# Patient Record
Sex: MALE | Race: WHITE | NOT HISPANIC OR LATINO | Employment: OTHER | URBAN - METROPOLITAN AREA
[De-identification: names, ages, dates, MRNs, and addresses within clinical notes are randomized per-mention and may not be internally consistent; named-entity substitution may affect disease eponyms.]

---

## 2022-05-26 ENCOUNTER — OFFICE VISIT (OUTPATIENT)
Dept: PODIATRY | Facility: CLINIC | Age: 77
End: 2022-05-26
Payer: MEDICARE

## 2022-05-26 VITALS
HEIGHT: 74 IN | TEMPERATURE: 98 F | HEART RATE: 69 BPM | DIASTOLIC BLOOD PRESSURE: 76 MMHG | BODY MASS INDEX: 30.8 KG/M2 | SYSTOLIC BLOOD PRESSURE: 145 MMHG | WEIGHT: 240 LBS

## 2022-05-26 DIAGNOSIS — L97.511 ULCER OF BOTH FEET, LIMITED TO BREAKDOWN OF SKIN: ICD-10-CM

## 2022-05-26 DIAGNOSIS — L97.521 ULCER OF BOTH FEET, LIMITED TO BREAKDOWN OF SKIN: ICD-10-CM

## 2022-05-26 DIAGNOSIS — E11.49 TYPE II DIABETES MELLITUS WITH NEUROLOGICAL MANIFESTATIONS: Primary | ICD-10-CM

## 2022-05-26 DIAGNOSIS — E11.9 COMPREHENSIVE DIABETIC FOOT EXAMINATION, TYPE 2 DM, ENCOUNTER FOR: ICD-10-CM

## 2022-05-26 PROCEDURE — 99204 OFFICE O/P NEW MOD 45 MIN: CPT | Mod: PBBFAC,PN | Performed by: PODIATRIST

## 2022-05-26 PROCEDURE — 99999 PR PBB SHADOW E&M-NEW PATIENT-LVL IV: ICD-10-PCS | Mod: PBBFAC,,, | Performed by: PODIATRIST

## 2022-05-26 PROCEDURE — 99999 PR PBB SHADOW E&M-NEW PATIENT-LVL IV: CPT | Mod: PBBFAC,,, | Performed by: PODIATRIST

## 2022-05-26 PROCEDURE — 99203 PR OFFICE/OUTPT VISIT, NEW, LEVL III, 30-44 MIN: ICD-10-PCS | Mod: S$PBB,,, | Performed by: PODIATRIST

## 2022-05-26 PROCEDURE — 99203 OFFICE O/P NEW LOW 30 MIN: CPT | Mod: S$PBB,,, | Performed by: PODIATRIST

## 2022-05-26 RX ORDER — AMLODIPINE BESYLATE 10 MG/1
TABLET ORAL
COMMUNITY
Start: 2021-07-06

## 2022-05-26 RX ORDER — LEVOTHYROXINE SODIUM 100 UG/1
100 TABLET ORAL EVERY MORNING
COMMUNITY
Start: 2022-05-06

## 2022-05-26 RX ORDER — ASPIRIN 81 MG/1
81 TABLET ORAL
COMMUNITY

## 2022-05-26 RX ORDER — CLOPIDOGREL BISULFATE 75 MG/1
TABLET ORAL
COMMUNITY
Start: 2021-06-10

## 2022-05-26 RX ORDER — EXENATIDE 2 MG/.85ML
INJECTION, SUSPENSION, EXTENDED RELEASE SUBCUTANEOUS
COMMUNITY
Start: 2021-06-10 | End: 2022-06-10

## 2022-05-26 RX ORDER — CELECOXIB 200 MG/1
CAPSULE ORAL
COMMUNITY
Start: 2022-03-07

## 2022-05-26 RX ORDER — HYDROCHLOROTHIAZIDE 12.5 MG/1
TABLET ORAL
COMMUNITY
Start: 2021-08-25

## 2022-05-26 RX ORDER — METFORMIN HYDROCHLORIDE 1000 MG/1
TABLET ORAL
COMMUNITY
Start: 2021-07-06

## 2022-05-26 RX ORDER — METOPROLOL SUCCINATE 50 MG/1
50 TABLET, EXTENDED RELEASE ORAL DAILY
COMMUNITY
Start: 2022-05-06

## 2022-05-26 RX ORDER — RAMIPRIL 10 MG/1
20 CAPSULE ORAL DAILY
COMMUNITY
Start: 2022-03-21

## 2022-05-26 RX ORDER — LANOLIN ALCOHOL/MO/W.PET/CERES
400 CREAM (GRAM) TOPICAL
COMMUNITY

## 2022-05-26 RX ORDER — EMPAGLIFLOZIN 25 MG/1
1 TABLET, FILM COATED ORAL EVERY MORNING
COMMUNITY
Start: 2021-09-08 | End: 2022-09-08

## 2022-05-26 RX ORDER — CHOLECALCIFEROL (VITAMIN D3) 25 MCG
1000 TABLET ORAL
COMMUNITY

## 2022-05-26 RX ORDER — ATORVASTATIN CALCIUM 20 MG/1
TABLET, FILM COATED ORAL
COMMUNITY
Start: 2021-07-06

## 2022-05-30 NOTE — PROGRESS NOTES
"Ice 20 minutes per hour, (20 minutes on, 40 minutes off) at least 4 times a day.   You can alternate with heat if desired.  Physical therapy/massage/back exercises and core strengthening can also be helpful.  Tylenol/ibuprofen as needed for pain.  You may use the Flexeril as needed for spasm.  This can cause drowsiness or you may want to use it just at bedtime.  Some people find it helpful, others not so much.  Recheck in clinic with Dr. Wang if persistent problems.  Please return to the ED if you lose control of your bladder or bowels, fever over 100.4, weakness in the legs, or any concerns.  Gentle activity as tolerated today.  Avoid heavy lifting, bending or twisting until things settle down.  Please say \"hi\" to Liz for me!  It was nice visiting with both of you this morning.  I am glad you are feeling better and hope you continue to improve.    Thank you for choosing [facility]. We appreciate the opportunity to meet your urgent medical needs. Please let us know if we could have done anything to make your stay more satisfying.    After discharge, please closely monitor for any new or worsening symptoms. Return to the Emergency Department if you develop any acute worsening signs or symptoms.    If you had lab work, cultures or imaging studies done during your stay, the final results may still be pending. We will call you if your plan of care needs to change. However, if you are not improving as expected, please follow up with your primary care provider or clinic.     Start any prescription medications that were prescribed to you and take them as directed.     Please see additional handouts that may be pertinent to your condition.        " Subjective:       Patient ID: Moses Dela Cruz is a 77 y.o. male.    Chief Complaint: Callouses, Foot Problem, and Diabetes Mellitus    Patient presents today for a new patient diabetic evaluation.  Patient states he has been diabetic for many years he is mostly controlled he states his last A1c was 7.1.  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  Past Medical History:   Diagnosis Date    Diabetes mellitus, type 2     Hypertension      Past Surgical History:   Procedure Laterality Date    ACHILLES TENDON SURGERY      right     APPENDECTOMY      CAROTID STENT      CATARACT EXTRACTION      CHOLECYSTECTOMY      HERNIA REPAIR      TONSILLECTOMY       Family History   Problem Relation Age of Onset    Diabetes Sister      Social History     Socioeconomic History    Marital status:    Tobacco Use    Smoking status: Never Smoker    Smokeless tobacco: Never Used   Substance and Sexual Activity    Alcohol use: Yes    Drug use: Never    Sexual activity: Not Currently       Current Outpatient Medications   Medication Sig Dispense Refill    amLODIPine (NORVASC) 10 MG tablet       atorvastatin (LIPITOR) 20 MG tablet       celecoxib (CELEBREX) 200 MG capsule       clopidogreL (PLAVIX) 75 mg tablet       empagliflozin (JARDIANCE) 25 mg tablet Take 1 tablet by mouth every morning.      exenatide microspheres (BYDUREON BCISE) 2 mg/0.85 mL AtIn Inject into the skin.      hydroCHLOROthiazide (HYDRODIURIL) 12.5 MG Tab       metFORMIN (GLUCOPHAGE) 1000 MG tablet       aspirin (ECOTRIN) 81 MG EC tablet Take 81 mg by mouth.      levothyroxine (SYNTHROID) 100 MCG tablet Take 100 mcg by mouth every morning.      magnesium oxide (MAG-OX) 400 mg (241.3 mg magnesium) tablet Take 400 mg by mouth.      metoprolol succinate (TOPROL-XL) 50 MG 24 hr tablet Take 50 mg by mouth once daily.   "    ramipriL (ALTACE) 10 MG capsule Take 20 mg by mouth once daily.      vitamin D (VITAMIN D3) 1000 units Tab Take 1,000 Units by mouth.       No current facility-administered medications for this visit.     Review of patient's allergies indicates:   Allergen Reactions    Naproxen Swelling     Other reaction(s): Welts  welts, rash, hives. can tolerate asa 81mg, celebrex prn  welts, rash, hives. can tolerate asa 81mg, celebrex prn      Amoxicillin Other (See Comments)     Other reaction(s): Esophagitis    Testosterone      Other reaction(s): Rash    Adhesive Hives    Ibuprofen      Other reaction(s): Other (See Comments), Urticaria  unknown         Review of Systems   Musculoskeletal: Positive for arthralgias.   Neurological: Positive for numbness.   All other systems reviewed and are negative.      Objective:      Vitals:    05/26/22 0809   BP: (!) 145/76   Pulse: 69   Temp: 97.8 °F (36.6 °C)   Weight: 108.9 kg (240 lb)   Height: 6' 2" (1.88 m)     Physical Exam  Vitals and nursing note reviewed.   Constitutional:       Appearance: Normal appearance.   Cardiovascular:      Pulses:           Dorsalis pedis pulses are 1+ on the right side and 1+ on the left side.        Posterior tibial pulses are 1+ on the right side and 1+ on the left side.   Pulmonary:      Effort: Pulmonary effort is normal.   Musculoskeletal:         General: Tenderness and deformity present.      Right foot: Deformity present.      Left foot: Deformity present.        Feet:    Feet:      Right foot:      Protective Sensation: 3 sites tested. 1 site sensed.     Skin integrity: Ulcer, skin breakdown and callus present.      Left foot:      Protective Sensation: 3 sites tested. 1 site sensed.     Skin integrity: Callus present.   Skin:     Capillary Refill: Capillary refill takes 2 to 3 seconds.      Findings: Erythema present.   Neurological:      Mental Status: He is alert.      Sensory: Sensory deficit present.   Psychiatric:         " Mood and Affect: Mood normal.         Behavior: Behavior normal.         Thought Content: Thought content normal.         Judgment: Judgment normal.                          Assessment:       1. Type II diabetes mellitus with neurological manifestations    2. Comprehensive diabetic foot examination, type 2 DM, encounter for    3. Ulcer of both feet, limited to breakdown of skin        Plan:       Patient presents today for a new patient diabetic evaluation.  Patient states he has been diabetic for many years he is mostly controlled he states his last A1c was 7.1.  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  A comprehensive new patient diabetic evaluation was performed today patient has no areas of active infection no drainage however he does have multiple areas of pre ulcerative hyperkeratotic lesions on the plantar aspect of the patient's forefoot bilateral most specifically the plantar forefoot sub 4th and 5th metatarsal area right and the patient also has an area of breakdown and ulceration on the medial aspect of the 5th digit right.  Patient provided diabetic education all of the hyperkeratotic lesions were debrided non excisionally I have advised the patient he needs to start to apply 40% urea cream to the areas on the balls of his feet not to the area between the 4th and 5th digit on the right I dispensed the patient a Silipos toe spacer to use at all times after I debrided the area.  Patient was dispensed K1 [employer]'s large diabetic insoles I have added additional blue arch padding and will consider metatarsal pads as necessary advising the patient we need to transfer the weight from the ball of his foot into the arch area.  Patient will cover the area with 40% urea as well as a light bandage daily taking this off at night I will consider the metatarsal pads as necessary patient does have  an area of previous Achilles tendon repair right where he appears to have 2 prominent screws at the posterior aspect of the calcaneus right this is something that definitely needs to be monitored and is at risk for breakdown.  Plan follow-up will be over the next several weeks depending upon how the patient is doing with these areas of pre ulceration certainly if they become worse or painful again the patient will contact us immediately he states they were completely nontender following debridement today.  Extended visit time was provided today due to review of medical chart and the patient has multiple complaints as well as comprehensive diabetic evaluation and multiple areas of pre ulcerative hyperkeratotic lesions this includes fitting the patient for appropriate arch support.This note was created using [employer] voice recognition software that occasionally misinterpreted phrases or words.

## 2022-09-08 ENCOUNTER — OFFICE VISIT (OUTPATIENT)
Dept: PODIATRY | Facility: CLINIC | Age: 77
End: 2022-09-08
Payer: MEDICARE

## 2022-09-08 VITALS
HEART RATE: 72 BPM | WEIGHT: 240 LBS | SYSTOLIC BLOOD PRESSURE: 134 MMHG | DIASTOLIC BLOOD PRESSURE: 68 MMHG | BODY MASS INDEX: 30.8 KG/M2 | TEMPERATURE: 98 F | HEIGHT: 74 IN

## 2022-09-08 DIAGNOSIS — E11.9 COMPREHENSIVE DIABETIC FOOT EXAMINATION, TYPE 2 DM, ENCOUNTER FOR: ICD-10-CM

## 2022-09-08 DIAGNOSIS — E11.9 TYPE 2 DIABETES MELLITUS WITHOUT COMPLICATION, WITHOUT LONG-TERM CURRENT USE OF INSULIN: Primary | ICD-10-CM

## 2022-09-08 DIAGNOSIS — L97.511 ULCER OF RIGHT FOOT, LIMITED TO BREAKDOWN OF SKIN: ICD-10-CM

## 2022-09-08 PROCEDURE — 99214 OFFICE O/P EST MOD 30 MIN: CPT | Mod: PBBFAC,PN | Performed by: PODIATRIST

## 2022-09-08 PROCEDURE — 99213 OFFICE O/P EST LOW 20 MIN: CPT | Mod: S$PBB,,, | Performed by: PODIATRIST

## 2022-09-08 PROCEDURE — 99999 PR PBB SHADOW E&M-EST. PATIENT-LVL IV: ICD-10-PCS | Mod: PBBFAC,,, | Performed by: PODIATRIST

## 2022-09-08 PROCEDURE — 99999 PR PBB SHADOW E&M-EST. PATIENT-LVL IV: CPT | Mod: PBBFAC,,, | Performed by: PODIATRIST

## 2022-09-08 PROCEDURE — 99213 PR OFFICE/OUTPT VISIT, EST, LEVL III, 20-29 MIN: ICD-10-PCS | Mod: S$PBB,,, | Performed by: PODIATRIST

## 2022-09-08 RX ORDER — EXENATIDE 2 MG/.85ML
INJECTION, SUSPENSION, EXTENDED RELEASE SUBCUTANEOUS
COMMUNITY
Start: 2022-08-19

## 2022-09-10 PROBLEM — E11.9 COMPREHENSIVE DIABETIC FOOT EXAMINATION, TYPE 2 DM, ENCOUNTER FOR: Status: ACTIVE | Noted: 2022-09-10

## 2022-09-10 PROBLEM — L97.511 ULCER OF RIGHT FOOT, LIMITED TO BREAKDOWN OF SKIN: Status: ACTIVE | Noted: 2022-09-10

## 2022-09-10 PROBLEM — E11.9 TYPE 2 DIABETES MELLITUS WITHOUT COMPLICATION, WITHOUT LONG-TERM CURRENT USE OF INSULIN: Status: ACTIVE | Noted: 2022-09-10

## 2022-09-11 NOTE — PROGRESS NOTES
Subjective:       Patient ID: Moses Dela Cruz is a 77 y.o. male.    Chief Complaint: Diabetes Mellitus and Callouses    Patient presents today for a follow-up patient diabetic evaluation.  Patient states he has been diabetic for many years he is mostly controlled he states his last A1c was 7.1.  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  Past Medical History:   Diagnosis Date    Diabetes mellitus, type 2     Hypertension      Past Surgical History:   Procedure Laterality Date    ACHILLES TENDON SURGERY      right     APPENDECTOMY      CAROTID STENT      CATARACT EXTRACTION      CHOLECYSTECTOMY      HERNIA REPAIR      TONSILLECTOMY       Family History   Problem Relation Age of Onset    Diabetes Sister      Social History     Socioeconomic History    Marital status:    Tobacco Use    Smoking status: Never    Smokeless tobacco: Never   Substance and Sexual Activity    Alcohol use: Yes    Drug use: Never    Sexual activity: Not Currently       Current Outpatient Medications   Medication Sig Dispense Refill    amLODIPine (NORVASC) 10 MG tablet       aspirin (ECOTRIN) 81 MG EC tablet Take 81 mg by mouth.      atorvastatin (LIPITOR) 20 MG tablet       BYDUREON BCISE 2 mg/0.85 mL AtIn Inject into the skin.      clopidogreL (PLAVIX) 75 mg tablet       COVID-19 vac, marielena,Pfizer,,PF, (PFIZER COVID-19) 30 mcg/0.3 mL injection       hydroCHLOROthiazide (HYDRODIURIL) 12.5 MG Tab       levothyroxine (SYNTHROID) 100 MCG tablet Take 100 mcg by mouth every morning.      magnesium oxide (MAG-OX) 400 mg (241.3 mg magnesium) tablet Take 400 mg by mouth.      metFORMIN (GLUCOPHAGE) 1000 MG tablet       metoprolol succinate (TOPROL-XL) 50 MG 24 hr tablet Take 50 mg by mouth once daily.      ramipriL (ALTACE) 10 MG capsule Take 20 mg by mouth once daily.      vitamin D (VITAMIN D3) 1000 units Tab Take  "1,000 Units by mouth.      celecoxib (CELEBREX) 200 MG capsule        No current facility-administered medications for this visit.     Review of patient's allergies indicates:   Allergen Reactions    Naproxen Swelling     Other reaction(s): Welts  welts, rash, hives. can tolerate asa 81mg, celebrex prn  welts, rash, hives. can tolerate asa 81mg, celebrex prn      Amoxicillin Other (See Comments)     Other reaction(s): Esophagitis    Testosterone      Other reaction(s): Rash    Adhesive Hives    Ibuprofen      Other reaction(s): Other (See Comments), Urticaria  unknown         Review of Systems   Musculoskeletal:  Positive for arthralgias.   Neurological:  Positive for numbness.   All other systems reviewed and are negative.    Objective:      Vitals:    09/08/22 1047   BP: 134/68   Pulse: 72   Temp: 97.7 °F (36.5 °C)   Weight: 108.9 kg (240 lb)   Height: 6' 2" (1.88 m)     Physical Exam  Vitals and nursing note reviewed.   Constitutional:       Appearance: Normal appearance.   Cardiovascular:      Pulses:           Dorsalis pedis pulses are 1+ on the right side and 1+ on the left side.        Posterior tibial pulses are 1+ on the right side and 1+ on the left side.   Pulmonary:      Effort: Pulmonary effort is normal.   Musculoskeletal:         General: Tenderness and deformity present.      Right foot: Deformity present.      Left foot: Deformity present.        Feet:    Feet:      Right foot:      Protective Sensation: 3 sites tested.   1 site sensed.     Skin integrity: Ulcer, skin breakdown and callus present.      Left foot:      Protective Sensation: 3 sites tested.   1 site sensed.     Skin integrity: Callus present.   Skin:     Capillary Refill: Capillary refill takes 2 to 3 seconds.      Findings: Erythema present.   Neurological:      Mental Status: He is alert.      Sensory: Sensory deficit present.   Psychiatric:         Mood and Affect: Mood normal.         Behavior: Behavior normal.         Thought " Content: Thought content normal.         Judgment: Judgment normal.                          Assessment:       1. Type 2 diabetes mellitus without complication, without long-term current use of insulin    2. Comprehensive diabetic foot examination, type 2 DM, encounter for    3. Ulcer of right foot, limited to breakdown of skin        Plan:       Patient presents today for a follow-up patient diabetic evaluation.  Patient states he has been diabetic for many years he is mostly controlled he states his last A1c was 7.1.  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  A comprehensive new patient diabetic evaluation was performed today patient has no areas of active infection no drainage however he does have multiple areas of pre ulcerative hyperkeratotic lesions on the plantar aspect of the patient's forefoot bilateral most specifically the plantar forefoot sub 4th and 5th metatarsal area right and the patient also has an area of breakdown and ulceration on the medial aspect of the 5th digit right.  Patient provided diabetic education all of the hyperkeratotic lesions were debrided non excisionally I have advised the patient he needs to start to apply 40% urea cream to the areas on the balls of his feet not to the area between the 4th and 5th digit on the right I dispensed the patient a Silipos toe spacer to use at all times after I debrided the area.  Patient was dispensed [employer]'s large diabetic insoles I have added additional blue arch padding and will consider metatarsal pads as necessary advising the patient we need to transfer the weight from the ball of his foot into the arch area.  Patient will cover the area with 40% urea as well as a light bandage daily taking this off at night I will consider the metatarsal pads as necessary patient does have an area of previous Achilles tendon repair  right where he appears to have 2 prominent screws at the posterior aspect of the calcaneus right this is something that definitely needs to be monitored and is at risk for breakdown.  Patient still wearing the diabetic insoles that I gave him I did advised patient think we need to be a little bit more aggressive to offload pressure from the ball of the right foot I have added a medium metatarsal pad to try to get pressure off these areas where he has got a pre ulcerative buildup.  All areas of pre ulcerative buildup or debrided today patient is going to try the addition to the diabetic insoles and I plan to follow up with him as needed comprehensive diabetic evaluation performed.  This note was created using [employer] voice recognition software that occasionally misinterpreted phrases or words.

## 2025-03-18 ENCOUNTER — OFFICE VISIT (OUTPATIENT)
Dept: PODIATRY | Facility: CLINIC | Age: 80
End: 2025-03-18
Payer: MEDICARE

## 2025-03-18 VITALS
HEART RATE: 65 BPM | WEIGHT: 240.06 LBS | SYSTOLIC BLOOD PRESSURE: 149 MMHG | HEIGHT: 74 IN | BODY MASS INDEX: 30.81 KG/M2 | DIASTOLIC BLOOD PRESSURE: 70 MMHG

## 2025-03-18 DIAGNOSIS — E11.9 COMPREHENSIVE DIABETIC FOOT EXAMINATION, TYPE 2 DM, ENCOUNTER FOR: ICD-10-CM

## 2025-03-18 DIAGNOSIS — L97.521 ULCER OF BOTH FEET, LIMITED TO BREAKDOWN OF SKIN: Primary | ICD-10-CM

## 2025-03-18 DIAGNOSIS — L97.511 ULCER OF BOTH FEET, LIMITED TO BREAKDOWN OF SKIN: Primary | ICD-10-CM

## 2025-03-18 DIAGNOSIS — E11.9 TYPE 2 DIABETES MELLITUS WITHOUT COMPLICATION, WITHOUT LONG-TERM CURRENT USE OF INSULIN: ICD-10-CM

## 2025-03-18 PROCEDURE — 99213 OFFICE O/P EST LOW 20 MIN: CPT | Mod: S$PBB,,, | Performed by: PODIATRIST

## 2025-03-18 PROCEDURE — 99213 OFFICE O/P EST LOW 20 MIN: CPT | Mod: PBBFAC,PN | Performed by: PODIATRIST

## 2025-03-18 PROCEDURE — 99999 PR PBB SHADOW E&M-EST. PATIENT-LVL III: CPT | Mod: PBBFAC,,, | Performed by: PODIATRIST

## 2025-03-18 RX ORDER — SEMAGLUTIDE 2.68 MG/ML
2 INJECTION, SOLUTION SUBCUTANEOUS
COMMUNITY
Start: 2024-11-20

## 2025-03-18 RX ORDER — NITROGLYCERIN 0.4 MG/1
0.4 TABLET SUBLINGUAL
COMMUNITY
Start: 2025-01-07

## 2025-03-18 RX ORDER — CLOPIDOGREL BISULFATE 75 MG/1
75 TABLET ORAL
COMMUNITY
Start: 2025-02-18

## 2025-03-18 RX ORDER — MECLIZINE HCL 12.5 MG 12.5 MG/1
12.5 TABLET ORAL 2 TIMES DAILY PRN
COMMUNITY
Start: 2025-01-07

## 2025-03-18 RX ORDER — AMLODIPINE BESYLATE 5 MG/1
5 TABLET ORAL
COMMUNITY

## 2025-03-18 RX ORDER — FLUTICASONE PROPIONATE 50 MCG
1 SPRAY, SUSPENSION (ML) NASAL
COMMUNITY
Start: 2024-04-10

## 2025-03-18 RX ORDER — ATORVASTATIN CALCIUM 20 MG/1
20 TABLET, FILM COATED ORAL
COMMUNITY
Start: 2025-02-18

## 2025-03-18 RX ORDER — METFORMIN HYDROCHLORIDE 750 MG/1
750 TABLET, EXTENDED RELEASE ORAL 2 TIMES DAILY
COMMUNITY

## 2025-03-18 RX ORDER — HYDROCHLOROTHIAZIDE 12.5 MG/1
12.5 TABLET ORAL
COMMUNITY
Start: 2021-11-24

## 2025-03-18 RX ORDER — EMPAGLIFLOZIN 25 MG/1
25 TABLET, FILM COATED ORAL
COMMUNITY

## 2025-03-22 NOTE — PROGRESS NOTES
Subjective:       Patient ID: Moses Dela Cruz is a 79 y.o. male.    Chief Complaint: Callouses      Patient presents today for a follow-up patient diabetic evaluation.  Patient states he has been diabetic for many years he is mostly controlled he states his last A1c was 7.0.  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  Past Medical History:   Diagnosis Date    Diabetes mellitus, type 2     Hypertension      Past Surgical History:   Procedure Laterality Date    ACHILLES TENDON SURGERY      right     APPENDECTOMY      CAROTID STENT      CATARACT EXTRACTION      CHOLECYSTECTOMY      HERNIA REPAIR      TONSILLECTOMY       Family History   Problem Relation Name Age of Onset    Diabetes Sister       Social History     Socioeconomic History    Marital status:    Tobacco Use    Smoking status: Never    Smokeless tobacco: Never   Substance and Sexual Activity    Alcohol use: Yes    Drug use: Never    Sexual activity: Not Currently       Current Outpatient Medications   Medication Sig Dispense Refill    atorvastatin (LIPITOR) 20 MG tablet 20 mg.      clopidogreL (PLAVIX) 75 mg tablet 75 mg.      fluticasone propionate (FLONASE ALLERGY RELIEF) 50 mcg/actuation nasal spray 1 spray.      hydroCHLOROthiazide 12.5 MG Tab 12.5 mg.      meclizine (ANTIVERT) 12.5 mg tablet Take 12.5 mg by mouth 2 (two) times daily as needed.      nitroGLYCERIN (NITROSTAT) 0.4 MG SL tablet 0.4 mg.      OZEMPIC 2 mg/dose (8 mg/3 mL) PnIj 2 mg.      amLODIPine (NORVASC) 10 MG tablet       amLODIPine (NORVASC) 5 MG tablet Take 5 mg by mouth.      aspirin (ECOTRIN) 81 MG EC tablet Take 81 mg by mouth.      atorvastatin (LIPITOR) 20 MG tablet       BYDUREON BCISE 2 mg/0.85 mL AtIn Inject into the skin.      celecoxib (CELEBREX) 200 MG capsule       clopidogreL (PLAVIX) 75 mg tablet       COVID-19 vac, marielena,Pfizer,,PF,  "(PFIZER COVID-19) 30 mcg/0.3 mL injection       hydroCHLOROthiazide (HYDRODIURIL) 12.5 MG Tab       JARDIANCE 25 mg tablet Take 25 mg by mouth.      levothyroxine (SYNTHROID) 100 MCG tablet Take 100 mcg by mouth every morning.      magnesium oxide (MAG-OX) 400 mg (241.3 mg magnesium) tablet Take 400 mg by mouth.      metFORMIN (GLUCOPHAGE) 1000 MG tablet       metFORMIN (GLUCOPHAGE-XR) 750 MG ER 24hr tablet Take 750 mg by mouth 2 (two) times daily.      metoprolol succinate (TOPROL-XL) 50 MG 24 hr tablet Take 50 mg by mouth once daily.      ramipriL (ALTACE) 10 MG capsule Take 20 mg by mouth once daily.      vitamin D (VITAMIN D3) 1000 units Tab Take 1,000 Units by mouth.       No current facility-administered medications for this visit.     Review of patient's allergies indicates:   Allergen Reactions    Naproxen Swelling     Other reaction(s): Welts  welts, rash, hives. can tolerate asa 81mg, celebrex prn  welts, rash, hives. can tolerate asa 81mg, celebrex prn      Amoxicillin Other (See Comments)     Other reaction(s): Esophagitis    Latex Other (See Comments)    Testosterone      Other reaction(s): Rash    Adhesive Hives    Ibuprofen      Other reaction(s): Other (See Comments), Urticaria  unknown         Review of Systems   Musculoskeletal:  Positive for arthralgias.   Neurological:  Positive for numbness.   All other systems reviewed and are negative.      Objective:      Vitals:    03/18/25 1500   BP: (!) 149/70   Pulse: 65   Weight: 108.9 kg (240 lb 1.3 oz)   Height: 6' 2" (1.88 m)     Physical Exam  Vitals and nursing note reviewed.   Constitutional:       Appearance: Normal appearance.   Cardiovascular:      Pulses:           Dorsalis pedis pulses are 1+ on the right side and 1+ on the left side.        Posterior tibial pulses are 1+ on the right side and 1+ on the left side.   Pulmonary:      Effort: Pulmonary effort is normal.   Musculoskeletal:         General: Tenderness and deformity present.      " Right foot: Deformity present.      Left foot: Deformity present.        Feet:    Feet:      Right foot:      Protective Sensation: 3 sites tested.   1 site sensed.     Skin integrity: Ulcer, skin breakdown, callus and dry skin present.      Left foot:      Protective Sensation: 3 sites tested.   1 site sensed.     Skin integrity: Ulcer, skin breakdown, callus and dry skin present.   Skin:     Capillary Refill: Capillary refill takes 2 to 3 seconds.      Findings: Erythema present.   Neurological:      Mental Status: He is alert.      Sensory: Sensory deficit present.   Psychiatric:         Mood and Affect: Mood normal.         Behavior: Behavior normal.         Thought Content: Thought content normal.         Judgment: Judgment normal.                                                          Assessment:       1. Ulcer of both feet, limited to breakdown of skin    2. Type 2 diabetes mellitus without complication, without long-term current use of insulin    3. Comprehensive diabetic foot examination, type 2 DM, encounter for        Plan:       Patient presents today for a follow-up patient diabetic evaluation.  Patient states he has been diabetic for many years he is mostly controlled he states his last A1c was 7.0.  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  A comprehensive  patient diabetic evaluation was performed today patient has no areas of active infection no drainage however he does have multiple areas of pre ulcerative hyperkeratotic lesions on the plantar aspect of the patient's forefoot bilateral.  Patient has pre ulcerative areas sub 1st 3rd and 4th on the right foot sub 1st and 5th on the left.  Patient also has a new cut on the tip of the 5th digit left he states he cut himself trying to trim his toenails.  This area did not display active signs of infection and appeared to  be well healing.  Patient advised he needs to wear good supportive shoes at all times that have good cushioned insoles.  I did recommend the use of a 40% urea cream patient was given a printout showing him where and what type of cream to purchase this needs to be applied daily.  None of the pre ulcerative areas displayed active signs of infection each of these areas were debrided a light bandage and antibiotic ointment was applied to the 5th digit left.  Diabetic education provided.  Patient advised any problems questions or concerns to contact us immediately I do recommend diabetic re-evaluation every 6 months.  All areas of pre ulcerative buildup or debrided today patient is going to try the addition to the diabetic insoles and I plan to follow up with him as needed comprehensive diabetic evaluation performed.  This note was created using [employer] voice recognition software that occasionally misinterpreted phrases or words.